# Patient Record
Sex: FEMALE | Race: WHITE
[De-identification: names, ages, dates, MRNs, and addresses within clinical notes are randomized per-mention and may not be internally consistent; named-entity substitution may affect disease eponyms.]

---

## 2017-10-10 ENCOUNTER — HOSPITAL ENCOUNTER (INPATIENT)
Dept: HOSPITAL 90 - DAHIP | Age: 57
LOS: 261 days | Discharge: NURSING, REHAB OR LONG-TERM CARE | DRG: 470 | End: 2018-06-28
Attending: ORTHOPAEDIC SURGERY | Admitting: ORTHOPAEDIC SURGERY
Payer: MEDICARE

## 2017-10-10 VITALS — BODY MASS INDEX: 26.05 KG/M2 | HEIGHT: 70 IN | WEIGHT: 182 LBS

## 2017-10-10 DIAGNOSIS — M16.11: Primary | ICD-10-CM

## 2017-10-10 DIAGNOSIS — N18.2: ICD-10-CM

## 2017-10-10 DIAGNOSIS — Z83.3: ICD-10-CM

## 2017-10-10 DIAGNOSIS — I12.9: ICD-10-CM

## 2017-10-10 DIAGNOSIS — Z82.49: ICD-10-CM

## 2017-10-10 DIAGNOSIS — Z85.841: ICD-10-CM

## 2017-10-10 DIAGNOSIS — G89.4: ICD-10-CM

## 2017-10-10 DIAGNOSIS — F41.9: ICD-10-CM

## 2017-10-10 DIAGNOSIS — F32.9: ICD-10-CM

## 2017-10-10 PROCEDURE — 96375 TX/PRO/DX INJ NEW DRUG ADDON: CPT

## 2017-10-10 PROCEDURE — 88304 TISSUE EXAM BY PATHOLOGIST: CPT

## 2017-10-10 PROCEDURE — 96365 THER/PROPH/DIAG IV INF INIT: CPT

## 2017-10-10 PROCEDURE — 97039 UNLISTED MODALITY: CPT

## 2017-10-10 PROCEDURE — 85027 COMPLETE CBC AUTOMATED: CPT

## 2017-10-10 PROCEDURE — 36415 COLL VENOUS BLD VENIPUNCTURE: CPT

## 2017-10-10 PROCEDURE — 96374 THER/PROPH/DIAG INJ IV PUSH: CPT

## 2017-10-10 PROCEDURE — 81001 URINALYSIS AUTO W/SCOPE: CPT

## 2017-10-10 PROCEDURE — 80048 BASIC METABOLIC PNL TOTAL CA: CPT

## 2017-10-10 PROCEDURE — 73503 X-RAY EXAM HIP UNI 4/> VIEWS: CPT

## 2017-10-10 PROCEDURE — 88311 DECALCIFY TISSUE: CPT

## 2018-03-30 VITALS — SYSTOLIC BLOOD PRESSURE: 167 MMHG | DIASTOLIC BLOOD PRESSURE: 85 MMHG

## 2018-03-30 LAB
BASOPHILS NFR BLD AUTO: 0.5 % (ref 0–5)
BUN SERPL-MCNC: 15 MG/DL (ref 7–18)
CHLORIDE SERPL-SCNC: 104 MMOL/L (ref 101–111)
CO2 SERPL-SCNC: 31 MMOL/L (ref 21–32)
CREAT SERPL-MCNC: 0.8 MG/DL (ref 0.5–1.5)
EOSINOPHIL NFR BLD AUTO: 2.6 % (ref 0–8)
ERYTHROCYTE [DISTWIDTH] IN BLOOD BY AUTOMATED COUNT: 13.5 % (ref 11–15.5)
GFR SERPL CREATININE-BSD FRML MDRD: 78 ML/MIN (ref 60–?)
GLUCOSE SERPL-MCNC: 109 MG/DL (ref 70–105)
HCT VFR BLD AUTO: 42.4 % (ref 36–48)
LYMPHOCYTES NFR SPEC AUTO: 24 % (ref 21–51)
MCH RBC QN AUTO: 30.5 PG (ref 27–33)
MCHC RBC AUTO-ENTMCNC: 35.1 G/DL (ref 32–36)
MCV RBC AUTO: 87.1 FL (ref 79–99)
MONOCYTES NFR BLD AUTO: 6.9 % (ref 3–13)
NEUTROPHILS NFR BLD AUTO: 66 % (ref 40–77)
NRBC BLD MANUAL-RTO: 0 % (ref 0–0.19)
PLATELET # BLD AUTO: 358 K/UL (ref 130–400)
POTASSIUM SERPL-SCNC: 3.5 MMOL/L (ref 3.5–5.1)
RBC # BLD AUTO: 4.87 MIL/UL (ref 4–5.5)
SODIUM SERPL-SCNC: 145 MMOL/L (ref 136–145)
WBC # BLD AUTO: 8.7 K/UL (ref 4.8–10.8)

## 2018-03-30 RX ADMIN — CLINDAMYCIN PHOSPHATE SCH MLS/HR: 18 INJECTION, SOLUTION INTRAVENOUS at 13:00

## 2018-04-02 ENCOUNTER — HOSPITAL ENCOUNTER (OUTPATIENT)
Dept: HOSPITAL 90 - DAH | Age: 58
Setting detail: OBSERVATION
LOS: 1 days | Discharge: HOME | End: 2018-04-03
Attending: ORTHOPAEDIC SURGERY | Admitting: ORTHOPAEDIC SURGERY
Payer: MEDICARE

## 2018-04-02 VITALS — SYSTOLIC BLOOD PRESSURE: 166 MMHG | DIASTOLIC BLOOD PRESSURE: 82 MMHG

## 2018-04-02 VITALS — DIASTOLIC BLOOD PRESSURE: 85 MMHG | SYSTOLIC BLOOD PRESSURE: 169 MMHG

## 2018-04-02 VITALS — SYSTOLIC BLOOD PRESSURE: 155 MMHG | DIASTOLIC BLOOD PRESSURE: 81 MMHG

## 2018-04-02 VITALS — BODY MASS INDEX: 27.16 KG/M2 | HEIGHT: 69.5 IN | WEIGHT: 187.6 LBS

## 2018-04-02 VITALS — SYSTOLIC BLOOD PRESSURE: 153 MMHG | DIASTOLIC BLOOD PRESSURE: 84 MMHG

## 2018-04-02 VITALS — SYSTOLIC BLOOD PRESSURE: 112 MMHG | DIASTOLIC BLOOD PRESSURE: 72 MMHG

## 2018-04-02 VITALS — SYSTOLIC BLOOD PRESSURE: 184 MMHG | DIASTOLIC BLOOD PRESSURE: 85 MMHG

## 2018-04-02 VITALS — SYSTOLIC BLOOD PRESSURE: 144 MMHG | DIASTOLIC BLOOD PRESSURE: 100 MMHG

## 2018-04-02 VITALS — DIASTOLIC BLOOD PRESSURE: 69 MMHG | SYSTOLIC BLOOD PRESSURE: 131 MMHG

## 2018-04-02 VITALS — DIASTOLIC BLOOD PRESSURE: 84 MMHG | SYSTOLIC BLOOD PRESSURE: 153 MMHG

## 2018-04-02 VITALS — DIASTOLIC BLOOD PRESSURE: 73 MMHG | SYSTOLIC BLOOD PRESSURE: 136 MMHG

## 2018-04-02 VITALS — SYSTOLIC BLOOD PRESSURE: 149 MMHG | DIASTOLIC BLOOD PRESSURE: 75 MMHG

## 2018-04-02 VITALS — SYSTOLIC BLOOD PRESSURE: 143 MMHG | DIASTOLIC BLOOD PRESSURE: 71 MMHG

## 2018-04-02 VITALS — SYSTOLIC BLOOD PRESSURE: 165 MMHG | DIASTOLIC BLOOD PRESSURE: 84 MMHG

## 2018-04-02 VITALS — SYSTOLIC BLOOD PRESSURE: 157 MMHG | DIASTOLIC BLOOD PRESSURE: 82 MMHG

## 2018-04-02 VITALS — DIASTOLIC BLOOD PRESSURE: 67 MMHG | SYSTOLIC BLOOD PRESSURE: 122 MMHG

## 2018-04-02 VITALS — DIASTOLIC BLOOD PRESSURE: 73 MMHG | SYSTOLIC BLOOD PRESSURE: 144 MMHG

## 2018-04-02 VITALS — SYSTOLIC BLOOD PRESSURE: 152 MMHG | DIASTOLIC BLOOD PRESSURE: 89 MMHG

## 2018-04-02 VITALS — DIASTOLIC BLOOD PRESSURE: 68 MMHG | SYSTOLIC BLOOD PRESSURE: 125 MMHG

## 2018-04-02 VITALS — DIASTOLIC BLOOD PRESSURE: 84 MMHG | SYSTOLIC BLOOD PRESSURE: 159 MMHG

## 2018-04-02 VITALS — DIASTOLIC BLOOD PRESSURE: 65 MMHG | SYSTOLIC BLOOD PRESSURE: 121 MMHG

## 2018-04-02 VITALS — DIASTOLIC BLOOD PRESSURE: 78 MMHG | SYSTOLIC BLOOD PRESSURE: 146 MMHG

## 2018-04-02 VITALS — SYSTOLIC BLOOD PRESSURE: 134 MMHG | DIASTOLIC BLOOD PRESSURE: 71 MMHG

## 2018-04-02 VITALS — SYSTOLIC BLOOD PRESSURE: 116 MMHG | DIASTOLIC BLOOD PRESSURE: 70 MMHG

## 2018-04-02 VITALS — DIASTOLIC BLOOD PRESSURE: 84 MMHG | SYSTOLIC BLOOD PRESSURE: 163 MMHG

## 2018-04-02 DIAGNOSIS — I12.9: ICD-10-CM

## 2018-04-02 DIAGNOSIS — G62.9: ICD-10-CM

## 2018-04-02 DIAGNOSIS — N18.2: ICD-10-CM

## 2018-04-02 DIAGNOSIS — Z79.899: ICD-10-CM

## 2018-04-02 DIAGNOSIS — F32.9: ICD-10-CM
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
## 2018-04-02 DIAGNOSIS — S52.502P: Primary | ICD-10-CM

## 2018-04-02 DIAGNOSIS — F41.9: ICD-10-CM

## 2018-04-02 DIAGNOSIS — Z85.841: ICD-10-CM

## 2018-04-02 DIAGNOSIS — I10: ICD-10-CM

## 2018-04-02 DIAGNOSIS — G89.4: ICD-10-CM

## 2018-04-02 PROCEDURE — 96375 TX/PRO/DX INJ NEW DRUG ADDON: CPT

## 2018-04-02 PROCEDURE — 25405 REPAIR/GRAFT RADIUS OR ULNA: CPT

## 2018-04-02 PROCEDURE — 96365 THER/PROPH/DIAG IV INF INIT: CPT

## 2018-04-02 PROCEDURE — 36415 COLL VENOUS BLD VENIPUNCTURE: CPT

## 2018-04-02 PROCEDURE — 85025 COMPLETE CBC W/AUTO DIFF WBC: CPT

## 2018-04-02 PROCEDURE — 80048 BASIC METABOLIC PNL TOTAL CA: CPT

## 2018-04-02 PROCEDURE — 76000 FLUOROSCOPY <1 HR PHYS/QHP: CPT

## 2018-04-02 RX ADMIN — CLINDAMYCIN PHOSPHATE SCH: 18 INJECTION, SOLUTION INTRAVENOUS at 13:47

## 2018-04-02 RX ADMIN — BUPIVACAINE HYDROCHLORIDE AND EPINEPHRINE BITARTRATE ONE ML: 2.5; .005 INJECTION, SOLUTION EPIDURAL; INFILTRATION; INTRACAUDAL; PERINEURAL at 17:30

## 2018-04-02 RX ADMIN — ASPIRIN SCH MG: 325 TABLET, FILM COATED ORAL at 22:03

## 2018-04-03 VITALS — SYSTOLIC BLOOD PRESSURE: 130 MMHG | DIASTOLIC BLOOD PRESSURE: 70 MMHG

## 2018-04-03 VITALS — SYSTOLIC BLOOD PRESSURE: 108 MMHG | DIASTOLIC BLOOD PRESSURE: 54 MMHG

## 2018-04-03 VITALS — DIASTOLIC BLOOD PRESSURE: 63 MMHG | SYSTOLIC BLOOD PRESSURE: 113 MMHG

## 2018-04-03 RX ADMIN — OXYCODONE HYDROCHLORIDE PRN MG: 5 TABLET ORAL at 08:39

## 2018-04-03 RX ADMIN — OXYCODONE HYDROCHLORIDE PRN MG: 5 TABLET ORAL at 13:22

## 2018-04-03 RX ADMIN — SODIUM CHLORIDE SCH MLS/HR: 0.9 INJECTION, SOLUTION INTRAVENOUS at 00:01

## 2018-04-03 RX ADMIN — SODIUM CHLORIDE SCH MLS/HR: 0.9 INJECTION, SOLUTION INTRAVENOUS at 13:30

## 2018-04-03 RX ADMIN — CLINDAMYCIN PHOSPHATE SCH MLS/HR: 18 INJECTION, SOLUTION INTRAVENOUS at 06:20

## 2018-04-03 RX ADMIN — METOPROLOL TARTRATE SCH MG: 50 TABLET, FILM COATED ORAL at 00:03

## 2018-04-03 RX ADMIN — ACETAMINOPHEN PRN MG: 325 TABLET, FILM COATED ORAL at 13:23

## 2018-04-03 RX ADMIN — ASPIRIN SCH MG: 325 TABLET, FILM COATED ORAL at 08:38

## 2018-04-03 RX ADMIN — ACETAMINOPHEN PRN MG: 325 TABLET, FILM COATED ORAL at 00:35

## 2018-04-03 RX ADMIN — METOPROLOL TARTRATE SCH MG: 50 TABLET, FILM COATED ORAL at 08:38

## 2018-04-03 RX ADMIN — CLINDAMYCIN PHOSPHATE SCH MLS/HR: 18 INJECTION, SOLUTION INTRAVENOUS at 00:04

## 2018-06-22 VITALS — SYSTOLIC BLOOD PRESSURE: 183 MMHG | DIASTOLIC BLOOD PRESSURE: 94 MMHG

## 2018-06-22 LAB
DEPRECATED SQUAMOUS URNS QL MICRO: (no result) /HPF (ref 0–2)
PH UR STRIP: 5.5 [PH] (ref 5–8)
PROT UR QL STRIP: (no result)
SP GR UR STRIP: 1.04 (ref 1–1.03)
UROBILINOGEN UR STRIP-MCNC: 1 MG/DL (ref 0.2–1)
WBC #/AREA URNS HPF: (no result) /HPF (ref 0–1)

## 2018-06-25 VITALS — DIASTOLIC BLOOD PRESSURE: 77 MMHG | SYSTOLIC BLOOD PRESSURE: 145 MMHG

## 2018-06-25 VITALS — SYSTOLIC BLOOD PRESSURE: 141 MMHG | DIASTOLIC BLOOD PRESSURE: 80 MMHG

## 2018-06-25 VITALS — DIASTOLIC BLOOD PRESSURE: 78 MMHG | SYSTOLIC BLOOD PRESSURE: 149 MMHG

## 2018-06-25 VITALS — SYSTOLIC BLOOD PRESSURE: 179 MMHG | DIASTOLIC BLOOD PRESSURE: 86 MMHG

## 2018-06-25 VITALS — DIASTOLIC BLOOD PRESSURE: 70 MMHG | SYSTOLIC BLOOD PRESSURE: 131 MMHG

## 2018-06-25 VITALS — DIASTOLIC BLOOD PRESSURE: 68 MMHG | SYSTOLIC BLOOD PRESSURE: 127 MMHG

## 2018-06-25 VITALS — DIASTOLIC BLOOD PRESSURE: 75 MMHG | SYSTOLIC BLOOD PRESSURE: 145 MMHG

## 2018-06-25 VITALS — DIASTOLIC BLOOD PRESSURE: 90 MMHG | SYSTOLIC BLOOD PRESSURE: 157 MMHG

## 2018-06-25 VITALS — SYSTOLIC BLOOD PRESSURE: 155 MMHG | DIASTOLIC BLOOD PRESSURE: 76 MMHG

## 2018-06-25 VITALS — SYSTOLIC BLOOD PRESSURE: 135 MMHG | DIASTOLIC BLOOD PRESSURE: 71 MMHG

## 2018-06-25 VITALS — DIASTOLIC BLOOD PRESSURE: 76 MMHG | SYSTOLIC BLOOD PRESSURE: 151 MMHG

## 2018-06-25 VITALS — DIASTOLIC BLOOD PRESSURE: 88 MMHG | SYSTOLIC BLOOD PRESSURE: 174 MMHG

## 2018-06-25 VITALS — DIASTOLIC BLOOD PRESSURE: 70 MMHG | SYSTOLIC BLOOD PRESSURE: 137 MMHG

## 2018-06-25 VITALS — DIASTOLIC BLOOD PRESSURE: 87 MMHG | SYSTOLIC BLOOD PRESSURE: 158 MMHG

## 2018-06-25 VITALS — DIASTOLIC BLOOD PRESSURE: 81 MMHG | SYSTOLIC BLOOD PRESSURE: 159 MMHG

## 2018-06-25 VITALS — DIASTOLIC BLOOD PRESSURE: 82 MMHG | SYSTOLIC BLOOD PRESSURE: 163 MMHG

## 2018-06-25 VITALS — DIASTOLIC BLOOD PRESSURE: 67 MMHG | SYSTOLIC BLOOD PRESSURE: 143 MMHG

## 2018-06-25 VITALS — DIASTOLIC BLOOD PRESSURE: 91 MMHG | SYSTOLIC BLOOD PRESSURE: 134 MMHG

## 2018-06-25 VITALS — SYSTOLIC BLOOD PRESSURE: 152 MMHG | DIASTOLIC BLOOD PRESSURE: 79 MMHG

## 2018-06-25 VITALS — SYSTOLIC BLOOD PRESSURE: 129 MMHG | DIASTOLIC BLOOD PRESSURE: 64 MMHG

## 2018-06-25 VITALS — DIASTOLIC BLOOD PRESSURE: 80 MMHG | SYSTOLIC BLOOD PRESSURE: 156 MMHG

## 2018-06-25 VITALS — SYSTOLIC BLOOD PRESSURE: 132 MMHG | DIASTOLIC BLOOD PRESSURE: 68 MMHG

## 2018-06-25 VITALS — DIASTOLIC BLOOD PRESSURE: 81 MMHG | SYSTOLIC BLOOD PRESSURE: 150 MMHG

## 2018-06-25 VITALS — DIASTOLIC BLOOD PRESSURE: 65 MMHG | SYSTOLIC BLOOD PRESSURE: 134 MMHG

## 2018-06-25 VITALS — SYSTOLIC BLOOD PRESSURE: 116 MMHG | DIASTOLIC BLOOD PRESSURE: 69 MMHG

## 2018-06-25 PROCEDURE — 0SR901Z REPLACEMENT OF RIGHT HIP JOINT WITH METAL SYNTHETIC SUBSTITUTE, OPEN APPROACH: ICD-10-PCS | Performed by: ORTHOPAEDIC SURGERY

## 2018-06-25 RX ADMIN — ACETAMINOPHEN SCH MG: 500 TABLET, COATED ORAL at 13:45

## 2018-06-25 RX ADMIN — ASPIRIN SCH MG: 325 TABLET, FILM COATED ORAL at 19:59

## 2018-06-25 RX ADMIN — OXYCODONE HYDROCHLORIDE PRN MG: 5 TABLET ORAL at 20:10

## 2018-06-25 RX ADMIN — PREGABALIN SCH MG: 25 CAPSULE ORAL at 19:59

## 2018-06-25 RX ADMIN — CLINDAMYCIN PHOSPHATE SCH: 18 INJECTION, SOLUTION INTRAVENOUS at 10:45

## 2018-06-25 RX ADMIN — SODIUM CHLORIDE SCH: 9 INJECTION, SOLUTION INTRAVENOUS at 08:55

## 2018-06-25 RX ADMIN — FAMOTIDINE SCH MG: 20 TABLET, FILM COATED ORAL at 19:59

## 2018-06-25 RX ADMIN — SODIUM CHLORIDE SCH MLS/HR: 0.9 INJECTION, SOLUTION INTRAVENOUS at 13:35

## 2018-06-25 RX ADMIN — METOPROLOL TARTRATE SCH MG: 50 TABLET, FILM COATED ORAL at 19:59

## 2018-06-25 RX ADMIN — OXCARBAZEPINE SCH MG: 300 TABLET, FILM COATED ORAL at 19:59

## 2018-06-25 RX ADMIN — CLINDAMYCIN PHOSPHATE SCH MLS/HR: 18 INJECTION, SOLUTION INTRAVENOUS at 08:30

## 2018-06-25 RX ADMIN — CLINDAMYCIN PHOSPHATE SCH MLS/HR: 18 INJECTION, SOLUTION INTRAVENOUS at 18:43

## 2018-06-25 RX ADMIN — ACETAMINOPHEN SCH MG: 500 TABLET, COATED ORAL at 19:59

## 2018-06-25 RX ADMIN — SODIUM CHLORIDE SCH MLS/HR: 9 INJECTION, SOLUTION INTRAVENOUS at 08:55

## 2018-06-26 VITALS — DIASTOLIC BLOOD PRESSURE: 82 MMHG | SYSTOLIC BLOOD PRESSURE: 158 MMHG

## 2018-06-26 VITALS — SYSTOLIC BLOOD PRESSURE: 148 MMHG | DIASTOLIC BLOOD PRESSURE: 80 MMHG

## 2018-06-26 VITALS — SYSTOLIC BLOOD PRESSURE: 122 MMHG | DIASTOLIC BLOOD PRESSURE: 68 MMHG

## 2018-06-26 VITALS — SYSTOLIC BLOOD PRESSURE: 147 MMHG | DIASTOLIC BLOOD PRESSURE: 80 MMHG

## 2018-06-26 VITALS — DIASTOLIC BLOOD PRESSURE: 58 MMHG | SYSTOLIC BLOOD PRESSURE: 118 MMHG

## 2018-06-26 VITALS — SYSTOLIC BLOOD PRESSURE: 140 MMHG | DIASTOLIC BLOOD PRESSURE: 63 MMHG

## 2018-06-26 LAB
BUN SERPL-MCNC: 13 MG/DL (ref 7–18)
CHLORIDE SERPL-SCNC: 108 MMOL/L (ref 101–111)
CO2 SERPL-SCNC: 28 MMOL/L (ref 21–32)
CREAT SERPL-MCNC: 0.7 MG/DL (ref 0.5–1.5)
ERYTHROCYTE [DISTWIDTH] IN BLOOD BY AUTOMATED COUNT: 13.8 % (ref 11–15.5)
GFR SERPL CREATININE-BSD FRML MDRD: 91 ML/MIN (ref 60–?)
GLUCOSE SERPL-MCNC: 109 MG/DL (ref 70–105)
HCT VFR BLD AUTO: 35.8 % (ref 36–48)
MCH RBC QN AUTO: 30 PG (ref 27–33)
MCHC RBC AUTO-ENTMCNC: 33.7 G/DL (ref 32–36)
MCV RBC AUTO: 89.3 FL (ref 79–99)
NRBC BLD MANUAL-RTO: 0 % (ref 0–0.19)
PLATELET # BLD AUTO: 334 K/UL (ref 130–400)
POTASSIUM SERPL-SCNC: 4.5 MMOL/L (ref 3.5–5.1)
RBC # BLD AUTO: 4.01 MIL/UL (ref 4–5.5)
SODIUM SERPL-SCNC: 142 MMOL/L (ref 136–145)
WBC # BLD AUTO: 11.2 K/UL (ref 4.8–10.8)

## 2018-06-26 RX ADMIN — ACETAMINOPHEN SCH MG: 500 TABLET, COATED ORAL at 15:19

## 2018-06-26 RX ADMIN — Medication SCH MG: at 08:17

## 2018-06-26 RX ADMIN — PREGABALIN SCH MG: 25 CAPSULE ORAL at 08:17

## 2018-06-26 RX ADMIN — METOPROLOL TARTRATE SCH MG: 50 TABLET, FILM COATED ORAL at 19:42

## 2018-06-26 RX ADMIN — OXYCODONE HYDROCHLORIDE PRN MG: 5 TABLET ORAL at 15:15

## 2018-06-26 RX ADMIN — FAMOTIDINE SCH MG: 20 TABLET, FILM COATED ORAL at 19:41

## 2018-06-26 RX ADMIN — ACETAMINOPHEN SCH MG: 500 TABLET, COATED ORAL at 21:20

## 2018-06-26 RX ADMIN — SODIUM CHLORIDE SCH MLS/HR: 0.9 INJECTION, SOLUTION INTRAVENOUS at 09:37

## 2018-06-26 RX ADMIN — FAMOTIDINE SCH MG: 20 TABLET, FILM COATED ORAL at 08:17

## 2018-06-26 RX ADMIN — ACETAMINOPHEN SCH MG: 500 TABLET, COATED ORAL at 05:26

## 2018-06-26 RX ADMIN — ASPIRIN SCH MG: 325 TABLET, FILM COATED ORAL at 19:41

## 2018-06-26 RX ADMIN — ACETAMINOPHEN PRN MG: 500 TABLET, COATED ORAL at 22:33

## 2018-06-26 RX ADMIN — PREGABALIN SCH MG: 25 CAPSULE ORAL at 19:41

## 2018-06-26 RX ADMIN — ASPIRIN SCH MG: 325 TABLET, FILM COATED ORAL at 08:17

## 2018-06-26 RX ADMIN — OXCARBAZEPINE SCH MG: 300 TABLET, FILM COATED ORAL at 19:41

## 2018-06-26 RX ADMIN — Medication SCH GM: at 08:18

## 2018-06-26 RX ADMIN — OXYCODONE HYDROCHLORIDE PRN MG: 5 TABLET ORAL at 22:32

## 2018-06-26 RX ADMIN — SODIUM CHLORIDE SCH MLS/HR: 0.9 INJECTION, SOLUTION INTRAVENOUS at 02:05

## 2018-06-26 RX ADMIN — ACETAMINOPHEN PRN MG: 500 TABLET, COATED ORAL at 10:19

## 2018-06-26 RX ADMIN — OXCARBAZEPINE SCH MG: 300 TABLET, FILM COATED ORAL at 08:18

## 2018-06-26 RX ADMIN — CLINDAMYCIN PHOSPHATE SCH MLS/HR: 18 INJECTION, SOLUTION INTRAVENOUS at 02:05

## 2018-06-26 RX ADMIN — OXYCODONE HYDROCHLORIDE PRN MG: 5 TABLET ORAL at 08:19

## 2018-06-26 RX ADMIN — METOPROLOL TARTRATE SCH MG: 50 TABLET, FILM COATED ORAL at 08:18

## 2018-06-27 VITALS — SYSTOLIC BLOOD PRESSURE: 105 MMHG | DIASTOLIC BLOOD PRESSURE: 64 MMHG

## 2018-06-27 VITALS — SYSTOLIC BLOOD PRESSURE: 146 MMHG | DIASTOLIC BLOOD PRESSURE: 75 MMHG

## 2018-06-27 VITALS — DIASTOLIC BLOOD PRESSURE: 103 MMHG | SYSTOLIC BLOOD PRESSURE: 156 MMHG

## 2018-06-27 VITALS — SYSTOLIC BLOOD PRESSURE: 126 MMHG | DIASTOLIC BLOOD PRESSURE: 82 MMHG

## 2018-06-27 VITALS — DIASTOLIC BLOOD PRESSURE: 80 MMHG | SYSTOLIC BLOOD PRESSURE: 150 MMHG

## 2018-06-27 VITALS — SYSTOLIC BLOOD PRESSURE: 102 MMHG | DIASTOLIC BLOOD PRESSURE: 51 MMHG

## 2018-06-27 VITALS — DIASTOLIC BLOOD PRESSURE: 72 MMHG | SYSTOLIC BLOOD PRESSURE: 127 MMHG

## 2018-06-27 RX ADMIN — METOPROLOL TARTRATE SCH MG: 50 TABLET, FILM COATED ORAL at 19:54

## 2018-06-27 RX ADMIN — METOPROLOL TARTRATE SCH MG: 50 TABLET, FILM COATED ORAL at 09:00

## 2018-06-27 RX ADMIN — ACETAMINOPHEN PRN MG: 500 TABLET, COATED ORAL at 19:53

## 2018-06-27 RX ADMIN — Medication SCH MG: at 09:00

## 2018-06-27 RX ADMIN — ASPIRIN SCH MG: 325 TABLET, FILM COATED ORAL at 09:00

## 2018-06-27 RX ADMIN — Medication SCH GM: at 09:00

## 2018-06-27 RX ADMIN — FAMOTIDINE SCH MG: 20 TABLET, FILM COATED ORAL at 19:53

## 2018-06-27 RX ADMIN — FAMOTIDINE SCH MG: 20 TABLET, FILM COATED ORAL at 09:00

## 2018-06-27 RX ADMIN — ACETAMINOPHEN SCH MG: 500 TABLET, COATED ORAL at 13:45

## 2018-06-27 RX ADMIN — PREGABALIN SCH MG: 25 CAPSULE ORAL at 20:42

## 2018-06-27 RX ADMIN — ASPIRIN SCH MG: 325 TABLET, FILM COATED ORAL at 19:52

## 2018-06-27 RX ADMIN — ACETAMINOPHEN SCH MG: 500 TABLET, COATED ORAL at 05:20

## 2018-06-27 RX ADMIN — OXCARBAZEPINE SCH MG: 300 TABLET, FILM COATED ORAL at 09:00

## 2018-06-27 RX ADMIN — PREGABALIN SCH MG: 25 CAPSULE ORAL at 09:00

## 2018-06-27 RX ADMIN — OXYCODONE HYDROCHLORIDE PRN MG: 5 TABLET ORAL at 19:53

## 2018-06-27 RX ADMIN — OXCARBAZEPINE SCH MG: 300 TABLET, FILM COATED ORAL at 19:52

## 2018-06-28 VITALS — SYSTOLIC BLOOD PRESSURE: 155 MMHG | DIASTOLIC BLOOD PRESSURE: 79 MMHG

## 2018-06-28 VITALS — DIASTOLIC BLOOD PRESSURE: 69 MMHG | SYSTOLIC BLOOD PRESSURE: 128 MMHG

## 2018-06-28 RX ADMIN — OXYCODONE HYDROCHLORIDE PRN MG: 5 TABLET ORAL at 00:16

## 2018-06-28 RX ADMIN — FAMOTIDINE SCH MG: 20 TABLET, FILM COATED ORAL at 09:02

## 2018-06-28 RX ADMIN — Medication SCH MG: at 09:02

## 2018-06-28 RX ADMIN — METOPROLOL TARTRATE SCH MG: 50 TABLET, FILM COATED ORAL at 09:02

## 2018-06-28 RX ADMIN — OXYCODONE HYDROCHLORIDE PRN MG: 5 TABLET ORAL at 10:24

## 2018-06-28 RX ADMIN — ASPIRIN SCH MG: 325 TABLET, FILM COATED ORAL at 09:02

## 2018-06-28 RX ADMIN — Medication SCH GM: at 09:02

## 2018-06-28 RX ADMIN — OXYCODONE HYDROCHLORIDE PRN MG: 5 TABLET ORAL at 05:48

## 2018-06-28 RX ADMIN — OXCARBAZEPINE SCH MG: 300 TABLET, FILM COATED ORAL at 09:02

## 2018-06-28 RX ADMIN — PREGABALIN SCH MG: 25 CAPSULE ORAL at 09:01

## 2018-06-28 RX ADMIN — ACETAMINOPHEN PRN MG: 500 TABLET, COATED ORAL at 00:16

## 2018-06-28 RX ADMIN — ACETAMINOPHEN PRN MG: 500 TABLET, COATED ORAL at 10:24

## 2018-10-19 ENCOUNTER — HOSPITAL ENCOUNTER (OUTPATIENT)
Dept: HOSPITAL 90 - DAHIP | Age: 58
Setting detail: OBSERVATION
LOS: 4 days | Discharge: HOME | End: 2018-10-23
Attending: ORTHOPAEDIC SURGERY | Admitting: ORTHOPAEDIC SURGERY
Payer: MEDICARE

## 2018-10-19 VITALS — BODY MASS INDEX: 27.02 KG/M2 | HEIGHT: 71 IN | WEIGHT: 193 LBS

## 2018-10-19 VITALS — SYSTOLIC BLOOD PRESSURE: 178 MMHG | DIASTOLIC BLOOD PRESSURE: 80 MMHG

## 2018-10-19 VITALS — DIASTOLIC BLOOD PRESSURE: 103 MMHG | SYSTOLIC BLOOD PRESSURE: 195 MMHG

## 2018-10-19 DIAGNOSIS — G89.4: ICD-10-CM

## 2018-10-19 DIAGNOSIS — I12.9: ICD-10-CM

## 2018-10-19 DIAGNOSIS — M25.532: ICD-10-CM
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
## 2018-10-19 DIAGNOSIS — S52.502K: Primary | ICD-10-CM
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
## 2018-10-19 DIAGNOSIS — M19.90: ICD-10-CM
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
## 2018-10-19 DIAGNOSIS — Z87.891: ICD-10-CM

## 2018-10-19 DIAGNOSIS — N18.2: ICD-10-CM

## 2018-10-19 DIAGNOSIS — F32.9: ICD-10-CM

## 2018-10-19 DIAGNOSIS — F41.9: ICD-10-CM

## 2018-10-19 LAB
BASOPHILS NFR BLD AUTO: 0.7 % (ref 0–5)
BUN SERPL-MCNC: 18 MG/DL (ref 7–18)
CHLORIDE SERPL-SCNC: 107 MMOL/L (ref 101–111)
CO2 SERPL-SCNC: 30 MMOL/L (ref 21–32)
CREAT SERPL-MCNC: 0.8 MG/DL (ref 0.5–1.5)
EOSINOPHIL NFR BLD AUTO: 4.6 % (ref 0–8)
ERYTHROCYTE [DISTWIDTH] IN BLOOD BY AUTOMATED COUNT: 14.7 % (ref 11–15.5)
GFR SERPL CREATININE-BSD FRML MDRD: 78 ML/MIN (ref 60–?)
GLUCOSE SERPL-MCNC: 109 MG/DL (ref 70–105)
HCT VFR BLD AUTO: 38.6 % (ref 36–48)
LYMPHOCYTES NFR SPEC AUTO: 30.1 % (ref 21–51)
MCH RBC QN AUTO: 29.6 PG (ref 27–33)
MCHC RBC AUTO-ENTMCNC: 34 G/DL (ref 32–36)
MCV RBC AUTO: 87.1 FL (ref 79–99)
MONOCYTES NFR BLD AUTO: 6.9 % (ref 3–13)
NEUTROPHILS NFR BLD AUTO: 57.7 % (ref 40–77)
NRBC BLD MANUAL-RTO: 0 % (ref 0–0.19)
PLATELET # BLD AUTO: 359 K/UL (ref 130–400)
POTASSIUM SERPL-SCNC: 4 MMOL/L (ref 3.5–5.1)
RBC # BLD AUTO: 4.43 MIL/UL (ref 4–5.5)
SODIUM SERPL-SCNC: 141 MMOL/L (ref 136–145)
WBC # BLD AUTO: 7.3 K/UL (ref 4.8–10.8)

## 2018-10-19 PROCEDURE — 96365 THER/PROPH/DIAG IV INF INIT: CPT

## 2018-10-19 PROCEDURE — 36415 COLL VENOUS BLD VENIPUNCTURE: CPT

## 2018-10-19 PROCEDURE — 80048 BASIC METABOLIC PNL TOTAL CA: CPT

## 2018-10-19 PROCEDURE — 96372 THER/PROPH/DIAG INJ SC/IM: CPT

## 2018-10-19 PROCEDURE — 76000 FLUOROSCOPY <1 HR PHYS/QHP: CPT

## 2018-10-19 PROCEDURE — 85025 COMPLETE CBC W/AUTO DIFF WBC: CPT

## 2018-10-19 PROCEDURE — 85027 COMPLETE CBC AUTOMATED: CPT

## 2018-10-19 PROCEDURE — 25405 REPAIR/GRAFT RADIUS OR ULNA: CPT

## 2018-10-19 PROCEDURE — 96366 THER/PROPH/DIAG IV INF ADDON: CPT

## 2018-10-22 VITALS — DIASTOLIC BLOOD PRESSURE: 60 MMHG | SYSTOLIC BLOOD PRESSURE: 120 MMHG

## 2018-10-22 VITALS — DIASTOLIC BLOOD PRESSURE: 66 MMHG | SYSTOLIC BLOOD PRESSURE: 131 MMHG

## 2018-10-22 VITALS — DIASTOLIC BLOOD PRESSURE: 88 MMHG | SYSTOLIC BLOOD PRESSURE: 168 MMHG

## 2018-10-22 VITALS — DIASTOLIC BLOOD PRESSURE: 84 MMHG | SYSTOLIC BLOOD PRESSURE: 165 MMHG

## 2018-10-22 VITALS — DIASTOLIC BLOOD PRESSURE: 88 MMHG | SYSTOLIC BLOOD PRESSURE: 162 MMHG

## 2018-10-22 VITALS — SYSTOLIC BLOOD PRESSURE: 139 MMHG | DIASTOLIC BLOOD PRESSURE: 77 MMHG

## 2018-10-22 VITALS — DIASTOLIC BLOOD PRESSURE: 73 MMHG | SYSTOLIC BLOOD PRESSURE: 133 MMHG

## 2018-10-22 VITALS — DIASTOLIC BLOOD PRESSURE: 94 MMHG | SYSTOLIC BLOOD PRESSURE: 166 MMHG

## 2018-10-22 VITALS — SYSTOLIC BLOOD PRESSURE: 137 MMHG | DIASTOLIC BLOOD PRESSURE: 68 MMHG

## 2018-10-22 VITALS — SYSTOLIC BLOOD PRESSURE: 133 MMHG | DIASTOLIC BLOOD PRESSURE: 64 MMHG

## 2018-10-22 VITALS — DIASTOLIC BLOOD PRESSURE: 83 MMHG | SYSTOLIC BLOOD PRESSURE: 144 MMHG

## 2018-10-22 VITALS — DIASTOLIC BLOOD PRESSURE: 68 MMHG | SYSTOLIC BLOOD PRESSURE: 127 MMHG

## 2018-10-22 VITALS — DIASTOLIC BLOOD PRESSURE: 90 MMHG | SYSTOLIC BLOOD PRESSURE: 170 MMHG

## 2018-10-22 VITALS — SYSTOLIC BLOOD PRESSURE: 119 MMHG | DIASTOLIC BLOOD PRESSURE: 71 MMHG

## 2018-10-22 VITALS — SYSTOLIC BLOOD PRESSURE: 132 MMHG | DIASTOLIC BLOOD PRESSURE: 70 MMHG

## 2018-10-22 VITALS — DIASTOLIC BLOOD PRESSURE: 101 MMHG | SYSTOLIC BLOOD PRESSURE: 193 MMHG

## 2018-10-22 VITALS — DIASTOLIC BLOOD PRESSURE: 64 MMHG | SYSTOLIC BLOOD PRESSURE: 120 MMHG

## 2018-10-22 VITALS — DIASTOLIC BLOOD PRESSURE: 90 MMHG | SYSTOLIC BLOOD PRESSURE: 189 MMHG

## 2018-10-22 VITALS — SYSTOLIC BLOOD PRESSURE: 139 MMHG | DIASTOLIC BLOOD PRESSURE: 60 MMHG

## 2018-10-22 VITALS — DIASTOLIC BLOOD PRESSURE: 85 MMHG | SYSTOLIC BLOOD PRESSURE: 179 MMHG

## 2018-10-22 VITALS — DIASTOLIC BLOOD PRESSURE: 69 MMHG | SYSTOLIC BLOOD PRESSURE: 130 MMHG

## 2018-10-22 VITALS — DIASTOLIC BLOOD PRESSURE: 95 MMHG | SYSTOLIC BLOOD PRESSURE: 189 MMHG

## 2018-10-22 VITALS — DIASTOLIC BLOOD PRESSURE: 72 MMHG | SYSTOLIC BLOOD PRESSURE: 139 MMHG

## 2018-10-22 RX ADMIN — CLINDAMYCIN PHOSPHATE SCH MLS/HR: 18 INJECTION, SOLUTION INTRAVENOUS at 20:10

## 2018-10-22 RX ADMIN — SODIUM CHLORIDE SCH MLS/HR: 0.9 INJECTION, SOLUTION INTRAVENOUS at 20:11

## 2018-10-22 RX ADMIN — HYDROCODONE BITARTRATE AND ACETAMINOPHEN PRN TAB: 7.5; 325 TABLET ORAL at 20:11

## 2018-10-22 RX ADMIN — ACETAMINOPHEN SCH MG: 500 TABLET, COATED ORAL at 15:15

## 2018-10-23 VITALS — SYSTOLIC BLOOD PRESSURE: 188 MMHG | DIASTOLIC BLOOD PRESSURE: 100 MMHG

## 2018-10-23 VITALS — DIASTOLIC BLOOD PRESSURE: 93 MMHG | SYSTOLIC BLOOD PRESSURE: 179 MMHG

## 2018-10-23 VITALS — DIASTOLIC BLOOD PRESSURE: 91 MMHG | SYSTOLIC BLOOD PRESSURE: 192 MMHG

## 2018-10-23 VITALS — SYSTOLIC BLOOD PRESSURE: 159 MMHG | DIASTOLIC BLOOD PRESSURE: 85 MMHG

## 2018-10-23 LAB
BUN SERPL-MCNC: 14 MG/DL (ref 7–18)
CHLORIDE SERPL-SCNC: 106 MMOL/L (ref 101–111)
CO2 SERPL-SCNC: 29 MMOL/L (ref 21–32)
CREAT SERPL-MCNC: 0.8 MG/DL (ref 0.5–1.5)
ERYTHROCYTE [DISTWIDTH] IN BLOOD BY AUTOMATED COUNT: 15 % (ref 11–15.5)
GFR SERPL CREATININE-BSD FRML MDRD: 78 ML/MIN (ref 60–?)
GLUCOSE SERPL-MCNC: 103 MG/DL (ref 70–105)
HCT VFR BLD AUTO: 35.7 % (ref 36–48)
MCH RBC QN AUTO: 30.1 PG (ref 27–33)
MCHC RBC AUTO-ENTMCNC: 34.6 G/DL (ref 32–36)
MCV RBC AUTO: 86.8 FL (ref 79–99)
NRBC BLD MANUAL-RTO: 0 % (ref 0–0.19)
PLATELET # BLD AUTO: 327 K/UL (ref 130–400)
POTASSIUM SERPL-SCNC: 3.9 MMOL/L (ref 3.5–5.1)
RBC # BLD AUTO: 4.11 MIL/UL (ref 4–5.5)
SODIUM SERPL-SCNC: 141 MMOL/L (ref 136–145)
WBC # BLD AUTO: 9.2 K/UL (ref 4.8–10.8)

## 2018-10-23 RX ADMIN — ACETAMINOPHEN SCH MG: 500 TABLET, COATED ORAL at 06:34

## 2018-10-23 RX ADMIN — HYDROCODONE BITARTRATE AND ACETAMINOPHEN PRN TAB: 7.5; 325 TABLET ORAL at 09:50

## 2018-10-23 RX ADMIN — ACETAMINOPHEN SCH MG: 500 TABLET, COATED ORAL at 15:36

## 2018-10-23 RX ADMIN — HYDROCODONE BITARTRATE AND ACETAMINOPHEN PRN TAB: 7.5; 325 TABLET ORAL at 05:19

## 2018-10-23 RX ADMIN — HYDROCODONE BITARTRATE AND ACETAMINOPHEN PRN TAB: 7.5; 325 TABLET ORAL at 19:14

## 2018-10-23 RX ADMIN — SODIUM CHLORIDE SCH MLS/HR: 0.9 INJECTION, SOLUTION INTRAVENOUS at 01:07

## 2018-10-23 RX ADMIN — METOPROLOL TARTRATE SCH MG: 50 TABLET, FILM COATED ORAL at 00:03

## 2018-10-23 RX ADMIN — ACETAMINOPHEN SCH MG: 500 TABLET, COATED ORAL at 00:03

## 2018-10-23 RX ADMIN — CLINDAMYCIN PHOSPHATE SCH MLS/HR: 18 INJECTION, SOLUTION INTRAVENOUS at 04:04

## 2018-10-23 RX ADMIN — METOPROLOL TARTRATE SCH MG: 50 TABLET, FILM COATED ORAL at 04:05

## 2018-11-04 ENCOUNTER — HOSPITAL ENCOUNTER (EMERGENCY)
Dept: HOSPITAL 90 - EDH | Age: 58
Discharge: HOME | End: 2018-11-04
Payer: MEDICARE

## 2018-11-04 DIAGNOSIS — Z88.8: ICD-10-CM

## 2018-11-04 DIAGNOSIS — Z88.1: ICD-10-CM

## 2018-11-04 DIAGNOSIS — Z94.6: ICD-10-CM

## 2018-11-04 DIAGNOSIS — I10: ICD-10-CM

## 2018-11-04 DIAGNOSIS — Z72.0: ICD-10-CM

## 2018-11-04 DIAGNOSIS — M25.551: Primary | ICD-10-CM

## 2018-11-04 DIAGNOSIS — Z88.2: ICD-10-CM

## 2018-11-04 LAB
ALBUMIN SERPL-MCNC: 3.8 G/DL (ref 3.5–5)
ALT SERPL-CCNC: 20 U/L (ref 12–78)
AST SERPL-CCNC: 27 U/L (ref 10–37)
BASOPHILS NFR BLD AUTO: 1.2 % (ref 0–5)
BILIRUB SERPL-MCNC: 0.2 MG/DL (ref 0.2–1)
BUN SERPL-MCNC: 29 MG/DL (ref 7–18)
CHLORIDE SERPL-SCNC: 99 MMOL/L (ref 101–111)
CO2 SERPL-SCNC: 28 MMOL/L (ref 21–32)
CREAT SERPL-MCNC: 2 MG/DL (ref 0.5–1.5)
EOSINOPHIL NFR BLD AUTO: 3.2 % (ref 0–8)
ERYTHROCYTE [DISTWIDTH] IN BLOOD BY AUTOMATED COUNT: 15.1 % (ref 11–15.5)
GFR SERPL CREATININE-BSD FRML MDRD: 27 ML/MIN (ref 60–?)
GLUCOSE SERPL-MCNC: 111 MG/DL (ref 70–105)
HCT VFR BLD AUTO: 37.6 % (ref 36–48)
LYMPHOCYTES NFR SPEC AUTO: 21.8 % (ref 21–51)
MCH RBC QN AUTO: 29.8 PG (ref 27–33)
MCHC RBC AUTO-ENTMCNC: 34 G/DL (ref 32–36)
MCV RBC AUTO: 87.7 FL (ref 79–99)
MONOCYTES NFR BLD AUTO: 7.2 % (ref 3–13)
NEUTROPHILS NFR BLD AUTO: 66.6 % (ref 40–77)
NRBC BLD MANUAL-RTO: 0.1 % (ref 0–0.19)
PH UR STRIP: 5 [PH] (ref 5–8)
PLATELET # BLD AUTO: 441 K/UL (ref 130–400)
POTASSIUM SERPL-SCNC: 3.1 MMOL/L (ref 3.5–5.1)
PROT SERPL-MCNC: 7.6 G/DL (ref 6–8.3)
PROT UR QL STRIP: (no result)
RBC # BLD AUTO: 4.29 MIL/UL (ref 4–5.5)
SODIUM SERPL-SCNC: 137 MMOL/L (ref 136–145)
SP GR UR STRIP: 1.02 (ref 1–1.03)
UROBILINOGEN UR STRIP-MCNC: 1 MG/DL (ref 0.2–1)
WBC # BLD AUTO: 10.8 K/UL (ref 4.8–10.8)
WBC #/AREA URNS HPF: (no result) /HPF (ref 0–1)

## 2018-11-04 PROCEDURE — 99285 EMERGENCY DEPT VISIT HI MDM: CPT

## 2018-11-04 PROCEDURE — 96361 HYDRATE IV INFUSION ADD-ON: CPT

## 2018-11-04 PROCEDURE — 36415 COLL VENOUS BLD VENIPUNCTURE: CPT

## 2018-11-04 PROCEDURE — 74176 CT ABD & PELVIS W/O CONTRAST: CPT

## 2018-11-04 PROCEDURE — 80053 COMPREHEN METABOLIC PANEL: CPT

## 2018-11-04 PROCEDURE — 85025 COMPLETE CBC W/AUTO DIFF WBC: CPT

## 2018-11-04 PROCEDURE — 96374 THER/PROPH/DIAG INJ IV PUSH: CPT

## 2018-11-04 PROCEDURE — 81001 URINALYSIS AUTO W/SCOPE: CPT

## 2018-11-05 ENCOUNTER — HOSPITAL ENCOUNTER (EMERGENCY)
Dept: HOSPITAL 90 - EDH | Age: 58
Discharge: HOME | End: 2018-11-05
Payer: MEDICARE

## 2018-11-05 DIAGNOSIS — I10: ICD-10-CM

## 2018-11-05 DIAGNOSIS — Z88.1: ICD-10-CM

## 2018-11-05 DIAGNOSIS — L29.9: ICD-10-CM

## 2018-11-05 DIAGNOSIS — F43.22: ICD-10-CM

## 2018-11-05 DIAGNOSIS — Z88.2: ICD-10-CM

## 2018-11-05 DIAGNOSIS — Z72.0: ICD-10-CM

## 2018-11-05 DIAGNOSIS — Z88.8: ICD-10-CM

## 2018-11-05 DIAGNOSIS — K21.0: Primary | ICD-10-CM

## 2018-11-05 DIAGNOSIS — N28.9: ICD-10-CM

## 2018-11-05 LAB
ALBUMIN SERPL-MCNC: 3.1 G/DL (ref 3.5–5)
ALT SERPL-CCNC: 20 U/L (ref 12–78)
AST SERPL-CCNC: 37 U/L (ref 10–37)
BASOPHILS NFR BLD AUTO: 0.6 % (ref 0–5)
BILIRUB SERPL-MCNC: 0.4 MG/DL (ref 0.2–1)
BUN SERPL-MCNC: 29 MG/DL (ref 7–18)
CHLORIDE SERPL-SCNC: 106 MMOL/L (ref 101–111)
CK SERPL-CCNC: 500 U/L (ref 21–232)
CO2 SERPL-SCNC: 22 MMOL/L (ref 21–32)
CREAT SERPL-MCNC: 1.7 MG/DL (ref 0.5–1.5)
EOSINOPHIL NFR BLD AUTO: 2 % (ref 0–8)
ERYTHROCYTE [DISTWIDTH] IN BLOOD BY AUTOMATED COUNT: 14.4 % (ref 11–15.5)
GFR SERPL CREATININE-BSD FRML MDRD: 33 ML/MIN (ref 60–?)
GLUCOSE SERPL-MCNC: 105 MG/DL (ref 70–105)
HCT VFR BLD AUTO: 33.7 % (ref 36–48)
LYMPHOCYTES NFR SPEC AUTO: 12.2 % (ref 21–51)
MCH RBC QN AUTO: 30.2 PG (ref 27–33)
MCHC RBC AUTO-ENTMCNC: 34.3 G/DL (ref 32–36)
MCV RBC AUTO: 88 FL (ref 79–99)
MONOCYTES NFR BLD AUTO: 6.7 % (ref 3–13)
NEUTROPHILS NFR BLD AUTO: 78.5 % (ref 40–77)
NRBC BLD MANUAL-RTO: 0.1 % (ref 0–0.19)
PLATELET # BLD AUTO: 373 K/UL (ref 130–400)
POTASSIUM SERPL-SCNC: 3.3 MMOL/L (ref 3.5–5.1)
PROT SERPL-MCNC: 6.4 G/DL (ref 6–8.3)
RBC # BLD AUTO: 3.82 MIL/UL (ref 4–5.5)
SODIUM SERPL-SCNC: 143 MMOL/L (ref 136–145)
WBC # BLD AUTO: 10.9 K/UL (ref 4.8–10.8)

## 2018-11-05 PROCEDURE — 99285 EMERGENCY DEPT VISIT HI MDM: CPT

## 2018-11-05 PROCEDURE — 80053 COMPREHEN METABOLIC PANEL: CPT

## 2018-11-05 PROCEDURE — 36415 COLL VENOUS BLD VENIPUNCTURE: CPT

## 2018-11-05 PROCEDURE — 96374 THER/PROPH/DIAG INJ IV PUSH: CPT

## 2018-11-05 PROCEDURE — 82550 ASSAY OF CK (CPK): CPT

## 2018-11-05 PROCEDURE — 84484 ASSAY OF TROPONIN QUANT: CPT

## 2018-11-05 PROCEDURE — 71045 X-RAY EXAM CHEST 1 VIEW: CPT

## 2018-11-05 PROCEDURE — 96375 TX/PRO/DX INJ NEW DRUG ADDON: CPT

## 2018-11-05 PROCEDURE — 85025 COMPLETE CBC W/AUTO DIFF WBC: CPT

## 2018-11-05 PROCEDURE — 93005 ELECTROCARDIOGRAM TRACING: CPT

## 2018-12-21 ENCOUNTER — HOSPITAL ENCOUNTER (OUTPATIENT)
Dept: HOSPITAL 90 - DAH | Age: 58
Discharge: HOME | End: 2018-12-21
Attending: ORTHOPAEDIC SURGERY
Payer: MEDICARE

## 2018-12-21 VITALS — SYSTOLIC BLOOD PRESSURE: 118 MMHG | DIASTOLIC BLOOD PRESSURE: 62 MMHG

## 2018-12-21 VITALS — DIASTOLIC BLOOD PRESSURE: 83 MMHG | SYSTOLIC BLOOD PRESSURE: 142 MMHG

## 2018-12-21 VITALS — DIASTOLIC BLOOD PRESSURE: 75 MMHG | SYSTOLIC BLOOD PRESSURE: 128 MMHG

## 2018-12-21 VITALS — SYSTOLIC BLOOD PRESSURE: 140 MMHG | DIASTOLIC BLOOD PRESSURE: 79 MMHG

## 2018-12-21 VITALS — DIASTOLIC BLOOD PRESSURE: 78 MMHG | SYSTOLIC BLOOD PRESSURE: 132 MMHG

## 2018-12-21 VITALS — SYSTOLIC BLOOD PRESSURE: 117 MMHG | DIASTOLIC BLOOD PRESSURE: 60 MMHG

## 2018-12-21 VITALS — SYSTOLIC BLOOD PRESSURE: 139 MMHG | DIASTOLIC BLOOD PRESSURE: 68 MMHG

## 2018-12-21 VITALS — DIASTOLIC BLOOD PRESSURE: 63 MMHG | SYSTOLIC BLOOD PRESSURE: 122 MMHG

## 2018-12-21 VITALS — BODY MASS INDEX: 24.78 KG/M2 | HEIGHT: 71 IN | WEIGHT: 177 LBS

## 2018-12-21 VITALS — DIASTOLIC BLOOD PRESSURE: 70 MMHG | SYSTOLIC BLOOD PRESSURE: 140 MMHG

## 2018-12-21 VITALS — SYSTOLIC BLOOD PRESSURE: 128 MMHG | DIASTOLIC BLOOD PRESSURE: 70 MMHG

## 2018-12-21 VITALS — SYSTOLIC BLOOD PRESSURE: 119 MMHG | DIASTOLIC BLOOD PRESSURE: 64 MMHG

## 2018-12-21 VITALS — SYSTOLIC BLOOD PRESSURE: 155 MMHG | DIASTOLIC BLOOD PRESSURE: 85 MMHG

## 2018-12-21 VITALS — SYSTOLIC BLOOD PRESSURE: 136 MMHG | DIASTOLIC BLOOD PRESSURE: 82 MMHG

## 2018-12-21 VITALS — SYSTOLIC BLOOD PRESSURE: 156 MMHG | DIASTOLIC BLOOD PRESSURE: 82 MMHG

## 2018-12-21 VITALS — SYSTOLIC BLOOD PRESSURE: 144 MMHG | DIASTOLIC BLOOD PRESSURE: 80 MMHG

## 2018-12-21 VITALS — SYSTOLIC BLOOD PRESSURE: 125 MMHG | DIASTOLIC BLOOD PRESSURE: 73 MMHG
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
## 2018-12-21 DIAGNOSIS — M19.90: ICD-10-CM

## 2018-12-21 DIAGNOSIS — Z87.891: ICD-10-CM
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
## 2018-12-21 DIAGNOSIS — F32.9: ICD-10-CM

## 2018-12-21 DIAGNOSIS — F41.9: ICD-10-CM

## 2018-12-21 DIAGNOSIS — G89.4: ICD-10-CM

## 2018-12-21 DIAGNOSIS — S56.212A: Primary | ICD-10-CM

## 2018-12-21 DIAGNOSIS — Z79.899: ICD-10-CM

## 2018-12-21 DIAGNOSIS — Z83.3: ICD-10-CM

## 2018-12-21 DIAGNOSIS — T85.698A: ICD-10-CM

## 2018-12-21 DIAGNOSIS — Z98.890: ICD-10-CM

## 2018-12-21 DIAGNOSIS — I12.9: ICD-10-CM

## 2018-12-21 DIAGNOSIS — Z82.49: ICD-10-CM

## 2018-12-21 DIAGNOSIS — N18.9: ICD-10-CM

## 2018-12-21 DIAGNOSIS — Z88.8: ICD-10-CM

## 2018-12-21 PROCEDURE — 76000 FLUOROSCOPY <1 HR PHYS/QHP: CPT

## 2018-12-21 PROCEDURE — 26370 REPAIR FINGER/HAND TENDON: CPT

## 2018-12-21 PROCEDURE — 20680 REMOVAL OF IMPLANT DEEP: CPT

## 2018-12-21 PROCEDURE — 88300 SURGICAL PATH GROSS: CPT

## 2018-12-21 RX ADMIN — CLINDAMYCIN PHOSPHATE SCH: 18 INJECTION, SOLUTION INTRAVENOUS at 11:35

## 2018-12-21 RX ADMIN — CLINDAMYCIN PHOSPHATE SCH MLS/HR: 18 INJECTION, SOLUTION INTRAVENOUS at 11:37

## 2018-12-21 NOTE — NUR
SKIN INTEGRITY

pt has multiple sores all over body,  

-------------------------------------------------------------------------------

Addendum: 12/21/18 at 1136 by ERASMO EDWARDS RN RN

-------------------------------------------------------------------------------

Amended: Links added.

## 2019-02-19 ENCOUNTER — HOSPITAL ENCOUNTER (OUTPATIENT)
Dept: HOSPITAL 90 - RAH | Age: 59
Discharge: HOME | End: 2019-02-19
Attending: ORTHOPAEDIC SURGERY
Payer: COMMERCIAL
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
## 2019-02-19 DIAGNOSIS — S52.532P: Primary | ICD-10-CM

## 2019-02-19 PROCEDURE — 73200 CT UPPER EXTREMITY W/O DYE: CPT
